# Patient Record
Sex: FEMALE | Race: WHITE
[De-identification: names, ages, dates, MRNs, and addresses within clinical notes are randomized per-mention and may not be internally consistent; named-entity substitution may affect disease eponyms.]

---

## 2017-08-16 ENCOUNTER — HOSPITAL ENCOUNTER (OUTPATIENT)
Dept: HOSPITAL 14 - H.ER | Age: 27
Setting detail: OBSERVATION
LOS: 1 days | Discharge: ANOTHER HOSPITAL | End: 2017-08-17
Attending: EMERGENCY MEDICINE | Admitting: EMERGENCY MEDICINE
Payer: MEDICAID

## 2017-08-16 DIAGNOSIS — F25.9: Primary | ICD-10-CM

## 2017-08-16 DIAGNOSIS — Z00.8: ICD-10-CM

## 2017-08-16 LAB
ALBUMIN SERPL-MCNC: 5.1 G/DL (ref 3.5–5)
ALBUMIN/GLOB SERPL: 1.3 {RATIO} (ref 1–2.1)
ALT SERPL-CCNC: 29 U/L (ref 9–52)
AST SERPL-CCNC: 27 U/L (ref 14–36)
BASOPHILS # BLD AUTO: 0 K/UL (ref 0–0.2)
BASOPHILS NFR BLD: 0.3 % (ref 0–2)
BUN SERPL-MCNC: 13 MG/DL (ref 7–17)
CALCIUM SERPL-MCNC: 9.6 MG/DL (ref 8.4–10.2)
EOSINOPHIL # BLD AUTO: 0 K/UL (ref 0–0.7)
EOSINOPHIL NFR BLD: 0.4 % (ref 0–4)
ERYTHROCYTE [DISTWIDTH] IN BLOOD BY AUTOMATED COUNT: 13.8 % (ref 11.5–14.5)
GFR NON-AFRICAN AMERICAN: > 60
HGB BLD-MCNC: 12.7 G/DL (ref 12–16)
LYMPHOCYTES # BLD AUTO: 2.7 K/UL (ref 1–4.3)
LYMPHOCYTES NFR BLD AUTO: 21.7 % (ref 20–40)
MCH RBC QN AUTO: 28.7 PG (ref 27–31)
MCHC RBC AUTO-ENTMCNC: 32.5 G/DL (ref 33–37)
MCV RBC AUTO: 88.3 FL (ref 81–99)
MONOCYTES # BLD: 0.7 K/UL (ref 0–0.8)
MONOCYTES NFR BLD: 5.4 % (ref 0–10)
NEUTROPHILS # BLD: 8.8 K/UL (ref 1.8–7)
NEUTROPHILS NFR BLD AUTO: 72.2 % (ref 50–75)
NRBC BLD AUTO-RTO: 0 % (ref 0–0)
PLATELET # BLD: 277 K/UL (ref 130–400)
PMV BLD AUTO: 8.4 FL (ref 7.2–11.7)
RBC # BLD AUTO: 4.43 MIL/UL (ref 3.8–5.2)
WBC # BLD AUTO: 12.3 K/UL (ref 4.8–10.8)

## 2017-08-16 NOTE — RAD
HISTORY:

clearance  



COMPARISON:

No prior. 



FINDINGS:



LUNGS:

The lungs are well inflated and clear. 



PLEURA:

No significant pleural effusion identified, no pneumothorax apparent.



CARDIOVASCULAR:

Normal.



OSSEOUS STRUCTURES:

No significant abnormalities.



VISUALIZED UPPER ABDOMEN:

Normal.



OTHER FINDINGS:

None.



IMPRESSION:

No active pulmonary disease.

## 2017-08-16 NOTE — ED PDOC
- Laboratory Results


Result Diagrams: 


 08/16/17 18:40





 08/16/17 18:40





- ECG


O2 Sat by Pulse Oximetry: 98





- Progress


ED Course And Treament: 





Case endorsed to writer from Hilda Hanna pending crisis eval





21:55


Notified from crisis worker that patient is to be screened by [facility] as per Dr. Miranda





23:30


Patient sleeping, no distress





8/17/17


1:00


Patient sleeping, no distress





2:00


Patient provided urine sample, sent to lab for u/a, UDS





2:30


Patient medically stable for [facility] screening.





4:00


Patient sleeping, no distress





6:00


Patient sleeping, no distress





Disposition





- Clinical Impression


Clinical Impression: 


 Schizoaffective disorder








- POA


Present On Arrival: None





- Disposition


Disposition: Transfer of Care


Disposition Time: 05:55


Condition: STABLE


Patient Signed Over To: yKle Reinoso


Handoff Comments: pending [facility] eval

## 2017-08-16 NOTE — ED PDOC
HPI: Psych/Substance Abuse


Time Seen by Provider: 08/16/17 18:17


Chief Complaint (Nursing): Psychiatric Evaluation


History Per: Patient, EMS


Additional Complaint(s): 





Pt. was brought in by [employer] as she was outside acting bizarre and yelling outside 

by herself. Pt. states she was having an argument but does not know with who 

nor does she know. Pt. does not answer questions properly and is yelling while 

in ED. 





Past Medical History


Reviewed: Historical Data, Nursing Documentation, Vital Signs





- Family History


Family History: States: No Known Family Hx





- Allergies


Allergies/Adverse Reactions: 


 Allergies











Allergy/AdvReac Type Severity Reaction Status Date / Time


 


No Known Allergies Allergy   Verified 08/16/17 18:14














Review of Systems


ROS Statement: Except As Marked, All Systems Reviewed And Found Negative





Physical Exam





- Reviewed


Nursing Documentation Reviewed: Yes


Vital Signs Reviewed: Yes





- Physical Exam


Appears: Positive for: Well, Non-toxic, No Acute Distress


Head Exam: Positive for: ATRAUMATIC, NORMAL INSPECTION, NORMOCEPHALIC


Skin: Positive for: Normal Color, Warm.  Negative for: Rash


Eye Exam: Positive for: EOMI, Normal appearance, PERRL


ENT: Positive for: Normal ENT Inspection


Neck: Positive for: Normal, Painless ROM


Cardiovascular/Chest: Positive for: Regular Rate, Rhythm


Respiratory: Positive for: CNT, Normal Breath Sounds


Gastrointestinal/Abdominal: Positive for: Normal Exam, Bowel Sounds, Soft


Back: Positive for: Normal Inspection


Extremity: Positive for: Normal ROM


Neurologic/Psych: Positive for: Alert, Oriented, Mood/Affect (crying 

inconsolably), Gait (steady, unassisted).  Negative for: Aphasia, Facial Droop





- Laboratory Results


Result Diagrams: 


 08/16/17 18:40





 08/16/17 18:40





Disposition





- Clinical Impression


Clinical Impression: 


 Manic behavior








- Patient ED Disposition


Is Patient to be Admitted: Transfer of Care (Signed out Deshaun SHIPMAN pending 

crisis evaluation and final disposition)





- Disposition


Disposition Time: 20:00


Condition: STABLE


Forms:  [facility] [facility] (English)

## 2017-08-17 VITALS
TEMPERATURE: 98.6 F | HEART RATE: 99 BPM | SYSTOLIC BLOOD PRESSURE: 111 MMHG | RESPIRATION RATE: 18 BRPM | DIASTOLIC BLOOD PRESSURE: 75 MMHG

## 2017-08-17 VITALS — OXYGEN SATURATION: 100 %

## 2017-08-17 LAB
B-HCG SERPL QL: NEGATIVE
BACTERIA #/AREA URNS HPF: (no result) /[HPF]
BILIRUB UR-MCNC: NEGATIVE MG/DL
COLOR UR: YELLOW
GLUCOSE UR STRIP-MCNC: (no result) MG/DL
LEUKOCYTE ESTERASE UR-ACNC: (no result) LEU/UL
PH UR STRIP: 6 [PH] (ref 5–8)
PROT UR STRIP-MCNC: NEGATIVE MG/DL
RBC # UR STRIP: NEGATIVE /UL
SP GR UR STRIP: 1.02 (ref 1–1.03)
SQUAMOUS EPITHIAL: 1 /HPF (ref 0–5)
URINE CLARITY: (no result)
URINE NITRATE: NEGATIVE
UROBILINOGEN UR-MCNC: (no result) MG/DL (ref 0.2–1)

## 2017-08-17 NOTE — CARD
--------------- APPROVED REPORT --------------





EKG Measurement

Heart [id number]

[id number]

[id number]

[id number]

[id number]



<Conclusion>

Normal sinus rhythm

Normal ECG

## 2017-08-17 NOTE — ED PDOC
- Laboratory Results


Result Diagrams: 


 17 18:40





 17 18:40





- ECG


O2 Sat by Pulse Oximetry: 98





Medical Decision Making


Medical Decision Makin


Patient signed out to me from UMBERTO Meade pending [facility] [place].





Patient is in ED OBS - all further documentation will take place in that 

section.








Scribe Attestation:


Documented by Muriel Enriquez acting as a scribe for Kyle Reinoso MD.





MD Scribe Attestation: 


All medical record entries made by the Scribe were at my direction and 

personally dictated by me. I have reviewed the chart and agree that the record 

accurately reflects my personal performance of the history, physical exam, 

medical decision making, and the department course for this patient. I have 

also personally directed, reviewed, and agree with the discharge instructions 

and disposition.





Disposition





- Clinical Impression


Clinical Impression: 


 Psychosis








- POA


Present On Arrival: None





- Disposition


Disposition: Other Institution


Disposition Time: 18:30 (17)


Condition: FAIR


Patient Signed Over To: Ben Geronimo III (17 at 0700)


Handoff Comments: Pending [facility] availability





ED OBSERVATION


Date of observation admission: 17


Time of observation admission: 18:30





- Observation admission statement


Patient is being placed in observation because:: 





Pending [facility] screening





- Goals of Observation


Goals of observation are:: 





Diagnosis





- Progress Note


Progress Note: 





17 06:00


Patient resting comfortably. Vitals stable.





17 06:11


Screener has evaluated patient and accepted her for transfer to the involuntary 

psychiatric unit at [facility].


Dx: psychosis


Condition: fair





17 07:00


Patient will be signed out to Dr. Geronimo pending [facility] bed availability.

## 2018-06-06 ENCOUNTER — HOSPITAL ENCOUNTER (EMERGENCY)
Dept: HOSPITAL 14 - H.ER | Age: 28
LOS: 1 days | Discharge: HOME | End: 2018-06-07
Payer: MEDICAID

## 2018-06-06 VITALS — BODY MASS INDEX: 20.1 KG/M2

## 2018-06-06 DIAGNOSIS — F20.9: Primary | ICD-10-CM

## 2018-06-06 DIAGNOSIS — E87.6: ICD-10-CM

## 2018-06-06 DIAGNOSIS — F32.9: ICD-10-CM

## 2018-06-06 LAB
ALBUMIN SERPL-MCNC: 4.1 G/DL (ref 3.5–5)
ALBUMIN/GLOB SERPL: 1.1 {RATIO} (ref 1–2.1)
ALT SERPL-CCNC: 20 U/L (ref 9–52)
APAP SERPL-MCNC: < 10 UG/ML (ref 10–30)
AST SERPL-CCNC: 32 U/L (ref 14–36)
BASOPHILS # BLD AUTO: 0 K/UL (ref 0–0.2)
BASOPHILS NFR BLD: 0.4 % (ref 0–2)
BUN SERPL-MCNC: 9 MG/DL (ref 7–17)
CALCIUM SERPL-MCNC: 9.2 MG/DL (ref 8.4–10.2)
EOSINOPHIL # BLD AUTO: 0 K/UL (ref 0–0.7)
EOSINOPHIL NFR BLD: 0.1 % (ref 0–4)
ERYTHROCYTE [DISTWIDTH] IN BLOOD BY AUTOMATED COUNT: 13.4 % (ref 11.5–14.5)
GFR NON-AFRICAN AMERICAN: > 60
HGB BLD-MCNC: 12.2 G/DL (ref 12–16)
LYMPHOCYTES # BLD AUTO: 1 K/UL (ref 1–4.3)
LYMPHOCYTES NFR BLD AUTO: 17.7 % (ref 20–40)
MCH RBC QN AUTO: 30.5 PG (ref 27–31)
MCHC RBC AUTO-ENTMCNC: 33.7 G/DL (ref 33–37)
MCV RBC AUTO: 90.5 FL (ref 81–99)
MONOCYTES # BLD: 0.3 K/UL (ref 0–0.8)
MONOCYTES NFR BLD: 4.6 % (ref 0–10)
NEUTROPHILS # BLD: 4.3 K/UL (ref 1.8–7)
NEUTROPHILS NFR BLD AUTO: 77.2 % (ref 50–75)
NRBC BLD AUTO-RTO: 0 % (ref 0–0)
PLATELET # BLD: 224 K/UL (ref 130–400)
PMV BLD AUTO: 7.5 FL (ref 7.2–11.7)
RBC # BLD AUTO: 4.01 MIL/UL (ref 3.8–5.2)
SALICYLATES SERPL-MCNC: < 1 MG/DL
WBC # BLD AUTO: 5.5 K/UL (ref 4.8–10.8)

## 2018-06-06 PROCEDURE — 80329 ANALGESICS NON-OPIOID 1 OR 2: CPT

## 2018-06-06 PROCEDURE — 93005 ELECTROCARDIOGRAM TRACING: CPT

## 2018-06-06 PROCEDURE — 80361 OPIATES 1 OR MORE: CPT

## 2018-06-06 PROCEDURE — 80349 CANNABINOIDS NATURAL: CPT

## 2018-06-06 PROCEDURE — 85025 COMPLETE CBC W/AUTO DIFF WBC: CPT

## 2018-06-06 PROCEDURE — 80353 DRUG SCREENING COCAINE: CPT

## 2018-06-06 PROCEDURE — 96372 THER/PROPH/DIAG INJ SC/IM: CPT

## 2018-06-06 PROCEDURE — 80345 DRUG SCREENING BARBITURATES: CPT

## 2018-06-06 PROCEDURE — 80346 BENZODIAZEPINES1-12: CPT

## 2018-06-06 PROCEDURE — 80320 DRUG SCREEN QUANTALCOHOLS: CPT

## 2018-06-06 PROCEDURE — 83992 ASSAY FOR PHENCYCLIDINE: CPT

## 2018-06-06 PROCEDURE — 80053 COMPREHEN METABOLIC PANEL: CPT

## 2018-06-06 PROCEDURE — 81025 URINE PREGNANCY TEST: CPT

## 2018-06-06 PROCEDURE — 99285 EMERGENCY DEPT VISIT HI MDM: CPT

## 2018-06-06 PROCEDURE — 80324 DRUG SCREEN AMPHETAMINES 1/2: CPT

## 2018-06-06 PROCEDURE — 82948 REAGENT STRIP/BLOOD GLUCOSE: CPT

## 2018-06-06 PROCEDURE — 80358 DRUG SCREENING METHADONE: CPT

## 2018-06-06 NOTE — ED PDOC
HPI: Psych/Substance Abuse


Time Seen by Provider: 06/06/18 18:49


Chief Complaint (Provider): Aggressive behavior


History Per: Patient, EMS


History/Exam Limitations: clinical condition


Onset/Duration Of Symptoms: Days (today)


Additional Complaint(s): 





Pt. found being aggressive and yelling in [employer] so EMS called to bring 

pt. to the ER.  Pt. talking in tangets about going to retirement and GOD.  Pt. denies 

any medical issues but has depression.  Denies taking any meds.  Denies drugs  

or alcohol.  Answers limited questions.  





Past Medical History


Reviewed: Nursing Documentation, Vital Signs





- Medical History


PMH: Depression


   Denies: Diabetes, Hepatitis, HIV, HTN, Seizures, Sexually Transmitted Disease





- Family History


Family History: States: Unknown Family Hx





- Allergies


Allergies/Adverse Reactions: 


 Allergies











Allergy/AdvReac Type Severity Reaction Status Date / Time


 


No Known Allergies Allergy   Verified 08/16/17 18:14














Review of Systems


Review Of Systems: ROS cannot be obtained secondary to pt's inabilty to answer 

questions.


Psych: Positive for: Psychosis





Physical Exam





- Reviewed


Nursing Documentation Reviewed: Yes


Vital Signs Reviewed: Yes





- Physical Exam


Appears: Positive for: Uncomfortable


Head Exam: Positive for: ATRAUMATIC, NORMAL INSPECTION, NORMOCEPHALIC


Skin: Positive for: Normal Color, Warm, DRY


Eye Exam: Positive for: EOMI, Normal appearance, PERRL


ENT: Positive for: Normal ENT Inspection


Neck: Positive for: Normal, Painless ROM


Cardiovascular/Chest: Positive for: Regular Rate, Rhythm


Respiratory: Positive for: CNT, Normal Breath Sounds


Gastrointestinal/Abdominal: Positive for: Normal Exam, Soft.  Negative for: 

Tenderness


Back: Positive for: Normal Inspection.  Negative for: L CVA Tenderness, R CVA 

Tenderness


Extremity: Positive for: Normal ROM.  Negative for: Tenderness


Neurologic/Psych: Positive for: Alert, Other (following some commands, but 

agitated and moving all her extremities; answering questions selectively.).  

Negative for: Motor/Sensory Deficits





- Laboratory Results


Result Diagrams: 


 06/06/18 19:55





 06/06/18 19:55


Interpretation Of Abn Labs: 3.5 k





- ECG


ECG: Positive for: Interpreted By Me, Viewed By Me


ECG Rhythm: Positive for: Normal QRS, Normal ST Segment, Sinus Rhythm





- Progress


ED Course And Treament: 





1926:  Pt. with aggressive behavior and kicking.  Threat to self and staff.  

Has acute psychosis possibly from psychiatric condition.  Will give sedation 

and restraints.  





2320:  Stable.  Crisis eval pending.  





2337:  Dr. Reinoso to take over care.  Pending crisis eval.   Pt. is stable.  

Resting.  Vitals maintained.





Disposition





- Clinical Impression


Clinical Impression: 


 Hypokalemia, Psychosis








- Patient ED Disposition


Is Patient to be Admitted: Transfer of Care





- Disposition


Disposition Time: 23:40


Condition: STABLE


Patient Signed Over To: Kyle Reinoso

## 2018-06-07 VITALS — SYSTOLIC BLOOD PRESSURE: 125 MMHG | DIASTOLIC BLOOD PRESSURE: 75 MMHG | HEART RATE: 82 BPM | RESPIRATION RATE: 18 BRPM

## 2018-06-07 VITALS — OXYGEN SATURATION: 97 %

## 2018-06-07 NOTE — ED PDOC
- Laboratory Results


Result Diagrams: 


 06/06/18 19:55





 06/06/18 19:55





- ECG


O2 Sat by Pulse Oximetry: 97 (RA)


Pulse Ox Interpretation: Normal





Medical Decision Making


Medical Decision Making: 





Time: 00:16


--Patient signed to this provider by Dr. NISHANT Ordoñez, pending Crisis evaluation.





--Patient evaluated by Crisis and is medically stable for discharge. Diagnosis 

schizophrenia effective. 








--------------------------------------------------------------------------------

-----------------


Scribe Attestation:


Documented by Yeny Tatum, acting as a scribe for Kyle Reinoso MD





Provider Scribe Attestation:


All medical record entries made by the Scribe were at my direction and 

personally dictated by me. I have reviewed the chart and agree that the record 

accurately reflects my personal performance of the history, physical exam, 

medical decision making, and the department course for this patient. I have 

also personally directed, reviewed, and agree with the discharge instructions 

and disposition.








Disposition





- Clinical Impression


Clinical Impression: 


 Hypokalemia, Schizoaffective disorder








- POA


Present On Arrival: None





- Disposition


Disposition: Routine/Home


Disposition Time: 02:00


Condition: STABLE


Instructions:  Schizoaffective Disorder


Forms:  [employer] (English)

## 2021-06-23 NOTE — ED PDOC
- Laboratory Results


Result Diagrams: 


 08/16/17 18:40





 08/16/17 18:40





- ECG


O2 Sat by Pulse Oximetry: 100





Medical Decision Making


Medical Decision Making: 





recd on endorsement from Dr Reinoso, pending [place] [facility]





~830a Pt became agitated, paranoid, hyper-Amish, responding to internal 

stimuli, required haldol 5mg for relief of agitation.





~915am per RN transportation to [facility] will arrive soon





Disposition


Counseled Patient/Family Regarding: Studies Performed, Diagnosis





- Clinical Impression


Clinical Impression: 


 Psychosis








- POA


Present On Arrival: None





- Disposition


Disposition: Other Institution


Disposition Time: 09:00


Condition: FAIR assault